# Patient Record
Sex: FEMALE | URBAN - METROPOLITAN AREA
[De-identification: names, ages, dates, MRNs, and addresses within clinical notes are randomized per-mention and may not be internally consistent; named-entity substitution may affect disease eponyms.]

---

## 2017-02-23 ENCOUNTER — HOSPITAL ENCOUNTER (EMERGENCY)
Facility: CLINIC | Age: 32
Discharge: HOME | End: 2017-02-23
Attending: EMERGENCY MEDICINE | Admitting: EMERGENCY MEDICINE
Payer: COMMERCIAL

## 2017-02-23 VITALS
TEMPERATURE: 98.9 F | RESPIRATION RATE: 16 BRPM | OXYGEN SATURATION: 98 % | DIASTOLIC BLOOD PRESSURE: 82 MMHG | SYSTOLIC BLOOD PRESSURE: 133 MMHG

## 2017-02-23 DIAGNOSIS — G40.409 OTHER GENERALIZED EPILEPSY, NOT INTRACTABLE, WITHOUT STATUS EPILEPTICUS (H): ICD-10-CM

## 2017-02-23 LAB
ALBUMIN UR-MCNC: 10 MG/DL
ANION GAP SERPL CALCULATED.3IONS-SCNC: 11 MMOL/L (ref 3–14)
APPEARANCE UR: CLEAR
BASOPHILS # BLD AUTO: 0 10E9/L (ref 0–0.2)
BASOPHILS NFR BLD AUTO: 0.4 %
BILIRUB UR QL STRIP: NEGATIVE
BUN SERPL-MCNC: 14 MG/DL (ref 7–30)
CALCIUM SERPL-MCNC: 8.8 MG/DL (ref 8.5–10.1)
CHLORIDE SERPL-SCNC: 109 MMOL/L (ref 94–109)
CO2 SERPL-SCNC: 20 MMOL/L (ref 20–32)
COLOR UR AUTO: ABNORMAL
CREAT SERPL-MCNC: 0.95 MG/DL (ref 0.52–1.04)
DIFFERENTIAL METHOD BLD: NORMAL
EOSINOPHIL # BLD AUTO: 0.1 10E9/L (ref 0–0.7)
EOSINOPHIL NFR BLD AUTO: 0.7 %
ERYTHROCYTE [DISTWIDTH] IN BLOOD BY AUTOMATED COUNT: 13.1 % (ref 10–15)
GFR SERPL CREATININE-BSD FRML MDRD: 69 ML/MIN/1.7M2
GLUCOSE SERPL-MCNC: 91 MG/DL (ref 70–99)
GLUCOSE UR STRIP-MCNC: NEGATIVE MG/DL
HCT VFR BLD AUTO: 36.1 % (ref 35–47)
HGB BLD-MCNC: 12.6 G/DL (ref 11.7–15.7)
HGB UR QL STRIP: NEGATIVE
HYALINE CASTS #/AREA URNS LPF: 1 /LPF (ref 0–2)
IMM GRANULOCYTES # BLD: 0 10E9/L (ref 0–0.4)
IMM GRANULOCYTES NFR BLD: 0.5 %
KETONES UR STRIP-MCNC: 10 MG/DL
LEUKOCYTE ESTERASE UR QL STRIP: NEGATIVE
LYMPHOCYTES # BLD AUTO: 1.2 10E9/L (ref 0.8–5.3)
LYMPHOCYTES NFR BLD AUTO: 16.3 %
MCH RBC QN AUTO: 30.2 PG (ref 26.5–33)
MCHC RBC AUTO-ENTMCNC: 34.9 G/DL (ref 31.5–36.5)
MCV RBC AUTO: 87 FL (ref 78–100)
MONOCYTES # BLD AUTO: 0.4 10E9/L (ref 0–1.3)
MONOCYTES NFR BLD AUTO: 4.9 %
MUCOUS THREADS #/AREA URNS LPF: PRESENT /LPF
NEUTROPHILS # BLD AUTO: 5.7 10E9/L (ref 1.6–8.3)
NEUTROPHILS NFR BLD AUTO: 77.2 %
NITRATE UR QL: NEGATIVE
NRBC # BLD AUTO: 0 10*3/UL
NRBC BLD AUTO-RTO: 0 /100
PH UR STRIP: 6.5 PH (ref 5–7)
PLATELET # BLD AUTO: 276 10E9/L (ref 150–450)
POTASSIUM SERPL-SCNC: 3.9 MMOL/L (ref 3.4–5.3)
RBC # BLD AUTO: 4.17 10E12/L (ref 3.8–5.2)
RBC #/AREA URNS AUTO: 1 /HPF (ref 0–2)
SODIUM SERPL-SCNC: 140 MMOL/L (ref 133–144)
SP GR UR STRIP: 1.01 (ref 1–1.03)
SQUAMOUS #/AREA URNS AUTO: <1 /HPF (ref 0–1)
URN SPEC COLLECT METH UR: ABNORMAL
UROBILINOGEN UR STRIP-MCNC: NORMAL MG/DL (ref 0–2)
WBC # BLD AUTO: 7.4 10E9/L (ref 4–11)
WBC #/AREA URNS AUTO: 4 /HPF (ref 0–2)

## 2017-02-23 PROCEDURE — 80048 BASIC METABOLIC PNL TOTAL CA: CPT | Performed by: EMERGENCY MEDICINE

## 2017-02-23 PROCEDURE — 25000132 ZZH RX MED GY IP 250 OP 250 PS 637: Performed by: EMERGENCY MEDICINE

## 2017-02-23 PROCEDURE — 99283 EMERGENCY DEPT VISIT LOW MDM: CPT

## 2017-02-23 PROCEDURE — 81001 URINALYSIS AUTO W/SCOPE: CPT | Performed by: EMERGENCY MEDICINE

## 2017-02-23 PROCEDURE — 85025 COMPLETE CBC W/AUTO DIFF WBC: CPT | Performed by: EMERGENCY MEDICINE

## 2017-02-23 RX ORDER — TOPIRAMATE 100 MG/1
200 TABLET, FILM COATED ORAL 2 TIMES DAILY
Qty: 28 TABLET | Refills: 0 | Status: SHIPPED | OUTPATIENT
Start: 2017-02-23 | End: 2017-03-02

## 2017-02-23 RX ORDER — LEVETIRACETAM 500 MG/1
1000 TABLET ORAL ONCE
Status: COMPLETED | OUTPATIENT
Start: 2017-02-23 | End: 2017-02-23

## 2017-02-23 RX ORDER — ACETAMINOPHEN 325 MG/1
650 TABLET ORAL ONCE
Status: COMPLETED | OUTPATIENT
Start: 2017-02-23 | End: 2017-02-23

## 2017-02-23 RX ADMIN — LEVETIRACETAM 1000 MG: 500 TABLET, FILM COATED ORAL at 12:28

## 2017-02-23 RX ADMIN — ACETAMINOPHEN 650 MG: 325 TABLET, FILM COATED ORAL at 12:08

## 2017-02-23 ASSESSMENT — ENCOUNTER SYMPTOMS
CONFUSION: 1
FEVER: 0
SEIZURES: 1

## 2017-02-23 NOTE — ED PROVIDER NOTES
History     Chief Complaint:  Seizure     HPI   Sara Longoria is a 31 year old female with a history of seizure disorder who presents for evaluation after multiple witnessed seizures. The patient was at the airport, waiting for a connecting flight, when she had a seizure witnessed by her family. The patient then had a second seizure witnessed by EMS. EMS report that it was tonic clonic lasting for 50 seconds and followed by post ictal state. EMS administered 5 mg Versed en route. Blood sugar was 96. Patient does have a prescription for Topamax but states that she has not been taking it recently, although she did have a dose this morning. She is not currently followed by neurology. Here, the patient is confused and does not know where she is. She has not had a seizure for over a year. The patient has been under increased stress recently secondary to work and frequent travel for [employer] competitions.     Allergies:  No known drug allergies.     Medications:    Topamax      Past Medical History:    Seizures     Past Surgical History:    No past surgical history on file.    Family History:    No family history on file.    Social History:   Patient is visiting from [place]  Presents to the ED with her family     Review of Systems   Constitutional: Negative for fever.   Neurological: Positive for seizures.   Psychiatric/Behavioral: Positive for confusion.   All other systems reviewed and are negative.      Physical Exam     Patient Vitals for the past 24 hrs:   BP Temp Temp src Heart Rate Resp SpO2   02/23/17 1215 133/82 - - - - -   02/23/17 1200 132/78 - - - - -   02/23/17 1145 (!) 129/94 - - - - -   02/23/17 1130 130/78 - - - - -   02/23/17 1046 104/72 98.9  F (37.2  C) Oral 131 16 98 %   02/23/17 1044 104/72 - - - - 98 %           Physical Exam  Eyes:  The pupils are equal and round    Conjunctivae and sclerae are normal  ENT:    The nose is normal    Pinnae are normal    The oropharynx reveals tongue  biting  Neck:  Normal range of motion    There is no rigidity noted    There is no midline cervical spine tenderness    Trachea is in the midline  CV:  Regular rate and rhythm     No edema  Resp:  Lungs are clear    Non-labored    No rales    No wheezing   GI:  Abdomen is soft, there is no rigidity    No distension    No rebound tenderness     Incontinent of urine  MS:  Normal muscular tone    No asymmetric leg swelling  Skin:  No rash or acute skin lesions noted  Neuro:   Somnolent. Moves all extremities.      Face is symmetric.     On repeat exam she is alert and interacts normally. She has normal  strength. FNF is normal bilaterally. Moves feet equally. Speech is clear. Tongue and uvula are midline. Face is symmetric.    Emergency Department Course     Laboratory:  UA: Clear yellow urine, 10 ketones, protein albumin 10 (A), WBC 4 (H), mucous present, otherwise WNL     CBC:  WBC 7.4, HGB 12.6, , otherwise WNL  BMP:  AWNL (Creatinine 0.95)     Interventions:  (1208) Tylenol 650 mg PO  (1228) Keppra 1 gram PO    Emergency Department Course:  The patient arrived in the emergency department via EMS.  Nursing notes and vitals reviewed.  (1041) I performed an exam of the patient as documented above.    Blood was drawn from the patient. This was sent for laboratory testing, findings above.   Urine sample was obtained and sent for laboratory analysis, findings above.     (1213) Findings and plan explained to the patient. Patient discharged home with instructions regarding supportive care, medications, and reasons to return. The importance of close follow-up was reviewed. The patient was prescribed Topamax.       Impression & Plan      Medical Decision Making:  Sara Longoria is a 31 year old female with a PMH significant for a seizure disorder who presents for evaluation of a spell consistent with a seizure. A broad differential diagnosis was considered for the seizure today including medicine non-compliance,  low or high levels of anti-epileptic, intracranial bleed, stroke, tumor, hypoglycemia, cerebral edema, metabolic derangement, cardiac arrhythmia, etc.  The patient has no signs of concerning etiologies of seizure or seizure-mimics at this time.  The head to toe trauma exam is negative; there are no signs of serious sequelae of trauma at this time such as spinal fractures, dislocations, etc. Supportive outpatient management is indicated. Oral keppra was given here in the ED given patient's upcoming travel plans to continue flying for seizure prophylaxis. She is given a prescription for her usual seizure medication.    Diagnosis:    ICD-10-CM   1. Other generalized epilepsy, not intractable, without status epilepticus (H) G40.409       Disposition:  Patient is discharged to home.       Discharge Medication List as of 2/23/2017 12:15 PM      START taking these medications    Details   !! topiramate (TOPAMAX) 100 MG tablet Take 2 tablets (200 mg) by mouth 2 times daily for 7 days, Disp-28 tablet, R-0, Local Print       !! - Potential duplicate medications found. Please discuss with provider.            Tevin Dunbar  2/23/2017    EMERGENCY DEPARTMENT    I, Tevin Dunbar, am serving as a scribe on 2/23/2017 at 10:41 AM to personally document services performed by Dr. Lindquist based on my observations and the provider's statements to me.       Manuel Lindquist MD  02/23/17 1955

## 2017-02-23 NOTE — ED AVS SNAPSHOT
Emergency Department    [address] [facility] [address]    Phone:  [phone]    Fax:  [phone]                                       Sara Longoria   MRN: [id number]    Department:   Emergency Department   Date of Visit:  2/23/2017           Patient Information     Date Of Birth          1985        Your diagnoses for this visit were:     Other generalized epilepsy, not intractable, without status epilepticus (H)        You were seen by Manuel Lindquist MD.      Follow-up Information     Follow up with  Emergency Department.    Specialty:  EMERGENCY MEDICINE    Why:  As needed    Contact information:    [address] [facility] [address] [phone]        Discharge Instructions         Living Well with Epilepsy  People with epilepsy can lead healthy, productive lives. Life with epilepsy can be challenging, but there are things you can do to make it easier. For example, you can pay attention to your emotions. If you feel down, upset, or scared, talk to your healthcare provider. And be open with the people in your life. Talking about epilepsy can help them understand. It can also help you feel better.  Coping with emotions  You may be scared to go out in public for fear of having a seizure. Or you may just get frustrated with having epilepsy. Such feelings are normal. But they can lead to anxiety and depression. Treatment is available for these conditions, so talk to your healthcare provider. Discuss what can help you, such as the following:    Support groups give you the chance to talk with other people who have epilepsy.    Counseling helps you learn to cope with your emotions and health problems.    Medicine can help if you have a mood disorder.     Recognizing signs of depression  Depression is an illness that affects your thoughts and feelings. It can be caused by trouble coping with epilepsy, and sometimes it may be caused by the medicines used to treat  it. Depression can be serious. If you have any of the following, call your healthcare provider:    Feeling down most of the time    Feeling hopeless or helpless    Losing pleasure in things you used to enjoy    Sleeping less or more than usual    Having a big change in appetite or weight    Having trouble focusing, remembering, or making decisions    Isolating yourself from friends or family    Coping at home  Epilepsy affects those around you, too. Talk with your loved ones and learn their concerns. For instance, your children may be afraid for your safety. Reassure them that you can live a long, healthy life with epilepsy. Your partner may wonder if a normal sex life is possible. Let him or her know that epilepsy doesn t have to affect intimacy. If loved ones have questions, you can always arrange a talk with your healthcare provider.  Epilepsy and your job  Epilepsy doesn t have to keep you from working. In fact, people with epilepsy hold many kinds of jobs. But there are some issues you should consider, such as:    What kind of work can I do? This depends on several things, like how well controlled your seizures are. Also consider whether the job involves tasks that may not be safe for you. These include driving or operating heavy machinery.    Should I tell my boss or coworkers about my epilepsy? This is your personal choice. But you may be safer if people at your workplace are prepared to respond to a seizure. If you are concerned about losing your job, know your rights. The Americans with Disabilities Act provides work-related protections for people with epilepsy.    3802-0371 [employer]. [address] [place] [address]. All rights reserved. This information is not intended as a substitute for professional medical care. Always follow your healthcare professional's instructions.          Discharge Instructions for Epilepsy  You have been diagnosed with epilepsy, a disorder of recurring  seizures. When you have a seizure, an electrical disturbance happens in your brain. There are different kinds of seizures, and each patient may have one or many types of seizures. Here are some guidelines for you and your family.  If you have a seizure  Ask friends and family members to learn seizure management. Also, tell them to do the following if you have a seizure:    Clear the area to prevent injury.    Position you on a flat, carpeted surface, if possible.    Don t try to restrain you.    Don t put anything in your mouth.    Turn you onto your side if you start to vomit.    Keep track of the date and time the seizure started, how long it lasted, whether or not you lost consciousness, a description of your body movements, what provoked the seizure (if known), and any injuries you suffered. Using a watch may help keep correct time of events.      Stay with you until you regain consciousness.    Call 911 if the seizure is longer than 5 minutes, if there are multiple seizures, or if you do not begin to wake up after the seizure stops.    Activities  Following are some things to consider:    Enjoy your normal activities. Most people with epilepsy lead normal lives.    Avoid hazardous activities, such as mountain climbing or scuba diving. A seizure under these conditions could lead to a fatal accident.    Do not swim alone or participate in other similar activities without others nearby.    Ask your healthcare provider about any restrictions on driving or other activities.    Check with your state department of public safety to learn whether there are any driving limitations based on your condition.  Other home care  Other considerations:    Take your medicine exactly as directed. Skipping doses can affect the way your body handles the medicine, which could cause you to have a seizure.    Don t drink alcohol or use any medicine without talking with your healthcare provider first.    Seizure medicines may interact  with other medicines. Make sure all of your healthcare providers have a list of all your medicines.     Birth control pills may not work as effectively when taking seizure medicines. Ask your healthcare provider if a change in birth control is needed.     Wear a medical alert pendant or bracelet that alerts others to your condition, especially if you are allergic to seizure medicine.    Join a local support group. Ask your healthcare provider for names and phone numbers.  .  When to seek medical attention  Tell your family members or friends to call 911 right away if you have:    Seizure that lasts more than 5 minutes    Multiple seizures in a row    No recovery of consciousness after the seizure stops  Otherwise, have them call your healthcare provider immediately if you have:    Seizures that are getting longer and worse    Seizures that are different from those you ve had in the past    Seizures strong enough to cause injury    Skin rash    Fever of 101.4 F (38.5 C) or higher     5621-9372 [employer]. [address] [place] [address]. All rights reserved. This information is not intended as a substitute for professional medical care. Always follow your healthcare professional's instructions.          24 Hour Appointment Hotline       To make an appointment at any [facility], call [phone] ([phone]). If you don't have a family doctor or clinic, we will help you find one. [facility] clinics are conveniently located to serve the needs of you and your family.             Review of your medicines      CONTINUE these medicines which may have CHANGED, or have new prescriptions. If we are uncertain of the size of tablets/capsules you have at home, strength may be listed as something that might have changed.        Dose / Directions Last dose taken    * TOPAMAX PO   What changed:  Another medication with the same name was added. Make sure you understand how and when to take each.         Refills:  0        * topiramate 100 MG tablet   Commonly known as:  TOPAMAX   Dose:  200 mg   What changed:  You were already taking a medication with the same name, and this prescription was added. Make sure you understand how and when to take each.   Quantity:  28 tablet        Take 2 tablets (200 mg) by mouth 2 times daily for 7 days   Refills:  0        * Notice:  This list has 2 medication(s) that are the same as other medications prescribed for you. Read the directions carefully, and ask your doctor or other care provider to review them with you.            Prescriptions were sent or printed at these locations (1 Prescription)                   Other Prescriptions                Printed at Department/Unit printer (1 of 1)         topiramate (TOPAMAX) 100 MG tablet                Procedures and tests performed during your visit     Basic metabolic panel    CBC with platelets + differential    UA reflex to Microscopic      Orders Needing Specimen Collection     None      Pending Results     No orders found from 2/21/2017 to 2/24/2017.            Pending Culture Results     No orders found from 2/21/2017 to 2/24/2017.             Test Results from your hospital stay     2/23/2017 11:05 AM - Interface, [employer] Results      Component Results     Component Value Ref Range & Units Status    WBC 7.4 4.0 - 11.0 10e9/L Final    RBC Count 4.17 3.8 - 5.2 10e12/L Final    Hemoglobin 12.6 11.7 - 15.7 g/dL Final    Hematocrit 36.1 35.0 - 47.0 % Final    MCV 87 78 - 100 fl Final    MCH 30.2 26.5 - 33.0 pg Final    MCHC 34.9 31.5 - 36.5 g/dL Final    RDW 13.1 10.0 - 15.0 % Final    Platelet Count 276 150 - 450 10e9/L Final    Diff Method Automated Method  Final    % Neutrophils 77.2 % Final    % Lymphocytes 16.3 % Final    % Monocytes 4.9 % Final    % Eosinophils 0.7 % Final    % Basophils 0.4 % Final    % Immature Granulocytes 0.5 % Final    Nucleated RBCs 0 0 /100 Final    Absolute Neutrophil 5.7 1.6 - 8.3 10e9/L Final     Absolute Lymphocytes 1.2 0.8 - 5.3 10e9/L Final    Absolute Monocytes 0.4 0.0 - 1.3 10e9/L Final    Absolute Eosinophils 0.1 0.0 - 0.7 10e9/L Final    Absolute Basophils 0.0 0.0 - 0.2 10e9/L Final    Abs Immature Granulocytes 0.0 0 - 0.4 10e9/L Final    Absolute Nucleated RBC 0.0  Final         2/23/2017 11:19 AM - Interface, [employer] Results      Component Results     Component Value Ref Range & Units Status    Sodium 140 133 - 144 mmol/L Final    Potassium 3.9 3.4 - 5.3 mmol/L Final    Chloride 109 94 - 109 mmol/L Final    Carbon Dioxide 20 20 - 32 mmol/L Final    Anion Gap 11 3 - 14 mmol/L Final    Glucose 91 70 - 99 mg/dL Final    Urea Nitrogen 14 7 - 30 mg/dL Final    Creatinine 0.95 0.52 - 1.04 mg/dL Final    GFR Estimate 69 >60 mL/min/1.7m2 Final    Non  GFR Calc    GFR Estimate If Black 83 >60 mL/min/1.7m2 Final    African American GFR Calc    Calcium 8.8 8.5 - 10.1 mg/dL Final         2/23/2017 11:33 AM - Interface, [employer] Results      Component Results     Component Value Ref Range & Units Status    Color Urine Light Yellow  Final    Appearance Urine Clear  Final    Glucose Urine Negative NEG mg/dL Final    Bilirubin Urine Negative NEG Final    Ketones Urine 10 (A) NEG mg/dL Final    Specific Gravity Urine 1.013 1.003 - 1.035 Final    Blood Urine Negative NEG Final    pH Urine 6.5 5.0 - 7.0 pH Final    Protein Albumin Urine 10 (A) NEG mg/dL Final    Urobilinogen mg/dL Normal 0.0 - 2.0 mg/dL Final    Nitrite Urine Negative NEG Final    Leukocyte Esterase Urine Negative NEG Final    Source Midstream Urine  Final    RBC Urine 1 0 - 2 /HPF Final    WBC Urine 4 (H) 0 - 2 /HPF Final    Squamous Epithelial /HPF Urine <1 0 - 1 /HPF Final    Mucous Urine Present (A) NEG /LPF Final    Hyaline Casts 1 0 - 2 /LPF Final                Clinical Quality Measure: Blood Pressure Screening     Your blood pressure was checked while you were in the emergency department today. The last reading we obtained  "was  BP: 132/78 . Please read the guidelines below about what these numbers mean and what you should do about them.  If your systolic blood pressure (the top number) is less than 120 and your diastolic blood pressure (the bottom number) is less than 80, then your blood pressure is normal. There is nothing more that you need to do about it.  If your systolic blood pressure (the top number) is 120-139 or your diastolic blood pressure (the bottom number) is 80-89, your blood pressure may be higher than it should be. You should have your blood pressure rechecked within a year by a primary care provider.  If your systolic blood pressure (the top number) is 140 or greater or your diastolic blood pressure (the bottom number) is 90 or greater, you may have high blood pressure. High blood pressure is treatable, but if left untreated over time it can put you at risk for heart attack, stroke, or kidney failure. You should have your blood pressure rechecked by a primary care provider within the next 4 weeks.  If your provider in the emergency department today gave you specific instructions to follow-up with your doctor or provider even sooner than that, you should follow that instruction and not wait for up to 4 weeks for your follow-up visit.        Thank you for choosing [facility]       Thank you for choosing [facility] for your care. Our goal is always to provide you with excellent care. Hearing back from our patients is one way we can continue to improve our services. Please take a few minutes to complete the written survey that you may receive in the mail after you visit with us. Thank you!        [employer] Information     [employer] lets you send messages to your doctor, view your test results, renew your prescriptions, schedule appointments and more. To sign up, go to [email or web address] . Click on \"Log in\" on the left side of the screen, which will take you to the Welcome page. Then click on \"Sign up Now\" on the right side " of the page.     You will be asked to enter the access code listed below, as well as some personal information. Please follow the directions to create your username and password.     Your access code is: [email or web address]  Expires: 2017 12:15 PM     Your access code will  in 90 days. If you need help or a new code, please call your [place] clinic or [phone].        Care EveryWhere ID     This is your Care EveryWhere ID. This could be used by other organizations to access your [place] medical records  [id number]        After Visit Summary       This is your record. Keep this with you and show to your community pharmacist(s) and doctor(s) at your next visit.

## 2017-02-23 NOTE — ED NOTES
Sara and family members in the Emergency room today, cleared to fly, needs to reschedule flight they missed for medical reasons.

## 2017-02-23 NOTE — ED AVS SNAPSHOT
Emergency Department    [address] [place] [address]    Phone:  [phone]    Fax:  [phone]                                       Sara Longoria   MRN: [id number]    Department:   Emergency Department   Date of Visit:  2/23/2017           After Visit Summary Signature Page     I have received my discharge instructions, and my questions have been answered. I have discussed any challenges I see with this plan with the nurse or doctor.    ..........................................................................................................................................  Patient/Patient Representative Signature      ..........................................................................................................................................  Patient Representative Print Name and Relationship to Patient    ..................................................               ................................................  Date                                            Time    ..........................................................................................................................................  Reviewed by Signature/Title    ...................................................              ..............................................  Date                                                            Time

## 2017-02-23 NOTE — ED NOTES
Bed: ED14  Expected date:   Expected time:   Means of arrival:   Comments:  [facility] [place] - 441 - 32F seizure eta 102

## 2017-02-23 NOTE — DISCHARGE INSTRUCTIONS
Living Well with Epilepsy  People with epilepsy can lead healthy, productive lives. Life with epilepsy can be challenging, but there are things you can do to make it easier. For example, you can pay attention to your emotions. If you feel down, upset, or scared, talk to your healthcare provider. And be open with the people in your life. Talking about epilepsy can help them understand. It can also help you feel better.  Coping with emotions  You may be scared to go out in public for fear of having a seizure. Or you may just get frustrated with having epilepsy. Such feelings are normal. But they can lead to anxiety and depression. Treatment is available for these conditions, so talk to your healthcare provider. Discuss what can help you, such as the following:    Support groups give you the chance to talk with other people who have epilepsy.    Counseling helps you learn to cope with your emotions and health problems.    Medicine can help if you have a mood disorder.     Recognizing signs of depression  Depression is an illness that affects your thoughts and feelings. It can be caused by trouble coping with epilepsy, and sometimes it may be caused by the medicines used to treat it. Depression can be serious. If you have any of the following, call your healthcare provider:    Feeling down most of the time    Feeling hopeless or helpless    Losing pleasure in things you used to enjoy    Sleeping less or more than usual    Having a big change in appetite or weight    Having trouble focusing, remembering, or making decisions    Isolating yourself from friends or family    Coping at home  Epilepsy affects those around you, too. Talk with your loved ones and learn their concerns. For instance, your children may be afraid for your safety. Reassure them that you can live a long, healthy life with epilepsy. Your partner may wonder if a normal sex life is possible. Let him or her know that epilepsy doesn t have to affect  intimacy. If loved ones have questions, you can always arrange a talk with your healthcare provider.  Epilepsy and your job  Epilepsy doesn t have to keep you from working. In fact, people with epilepsy hold many kinds of jobs. But there are some issues you should consider, such as:    What kind of work can I do? This depends on several things, like how well controlled your seizures are. Also consider whether the job involves tasks that may not be safe for you. These include driving or operating heavy machinery.    Should I tell my boss or coworkers about my epilepsy? This is your personal choice. But you may be safer if people at your workplace are prepared to respond to a seizure. If you are concerned about losing your job, know your rights. The Americans with Disabilities Act provides work-related protections for people with epilepsy.    1244-7536 [employer]. [address] [place] [address]. All rights reserved. This information is not intended as a substitute for professional medical care. Always follow your healthcare professional's instructions.          Discharge Instructions for Epilepsy  You have been diagnosed with epilepsy, a disorder of recurring seizures. When you have a seizure, an electrical disturbance happens in your brain. There are different kinds of seizures, and each patient may have one or many types of seizures. Here are some guidelines for you and your family.  If you have a seizure  Ask friends and family members to learn seizure management. Also, tell them to do the following if you have a seizure:    Clear the area to prevent injury.    Position you on a flat, carpeted surface, if possible.    Don t try to restrain you.    Don t put anything in your mouth.    Turn you onto your side if you start to vomit.    Keep track of the date and time the seizure started, how long it lasted, whether or not you lost consciousness, a description of your body movements, what  provoked the seizure (if known), and any injuries you suffered. Using a watch may help keep correct time of events.      Stay with you until you regain consciousness.    Call 911 if the seizure is longer than 5 minutes, if there are multiple seizures, or if you do not begin to wake up after the seizure stops.    Activities  Following are some things to consider:    Enjoy your normal activities. Most people with epilepsy lead normal lives.    Avoid hazardous activities, such as mountain climbing or scuba diving. A seizure under these conditions could lead to a fatal accident.    Do not swim alone or participate in other similar activities without others nearby.    Ask your healthcare provider about any restrictions on driving or other activities.    Check with your state department of public safety to learn whether there are any driving limitations based on your condition.  Other home care  Other considerations:    Take your medicine exactly as directed. Skipping doses can affect the way your body handles the medicine, which could cause you to have a seizure.    Don t drink alcohol or use any medicine without talking with your healthcare provider first.    Seizure medicines may interact with other medicines. Make sure all of your healthcare providers have a list of all your medicines.     Birth control pills may not work as effectively when taking seizure medicines. Ask your healthcare provider if a change in birth control is needed.     Wear a medical alert pendant or bracelet that alerts others to your condition, especially if you are allergic to seizure medicine.    Join a local support group. Ask your healthcare provider for names and phone numbers.  .  When to seek medical attention  Tell your family members or friends to call 911 right away if you have:    Seizure that lasts more than 5 minutes    Multiple seizures in a row    No recovery of consciousness after the seizure stops  Otherwise, have them call  your healthcare provider immediately if you have:    Seizures that are getting longer and worse    Seizures that are different from those you ve had in the past    Seizures strong enough to cause injury    Skin rash    Fever of 101.4 F (38.5 C) or higher     9704-3547 [employer]. [address] [place] [id number]. All rights reserved. This information is not intended as a substitute for professional medical care. Always follow your healthcare professional's instructions.